# Patient Record
Sex: MALE | Race: WHITE | Employment: OTHER | URBAN - METROPOLITAN AREA
[De-identification: names, ages, dates, MRNs, and addresses within clinical notes are randomized per-mention and may not be internally consistent; named-entity substitution may affect disease eponyms.]

---

## 2022-11-08 RX ORDER — ATORVASTATIN CALCIUM 20 MG/1
TABLET, FILM COATED ORAL
Qty: 90 TABLET | Refills: 1 | Status: SHIPPED | OUTPATIENT
Start: 2022-11-08

## 2022-11-19 RX ORDER — VALSARTAN 160 MG/1
TABLET ORAL
Qty: 90 TABLET | Refills: 1 | Status: SHIPPED | OUTPATIENT
Start: 2022-11-19

## 2023-04-10 ENCOUNTER — OFFICE VISIT (OUTPATIENT)
Dept: INTERNAL MEDICINE CLINIC | Age: 58
End: 2023-04-10
Payer: COMMERCIAL

## 2023-04-10 VITALS
DIASTOLIC BLOOD PRESSURE: 100 MMHG | SYSTOLIC BLOOD PRESSURE: 180 MMHG | BODY MASS INDEX: 35.25 KG/M2 | OXYGEN SATURATION: 97 % | HEART RATE: 108 BPM | TEMPERATURE: 98.2 F | WEIGHT: 238 LBS | RESPIRATION RATE: 16 BRPM | HEIGHT: 69 IN

## 2023-04-10 DIAGNOSIS — U07.1 COVID-19: Primary | ICD-10-CM

## 2023-04-10 DIAGNOSIS — R00.0 TACHYCARDIA: ICD-10-CM

## 2023-04-10 DIAGNOSIS — E78.00 HYPERCHOLESTEROLEMIA: ICD-10-CM

## 2023-04-10 DIAGNOSIS — J01.00 ACUTE NON-RECURRENT MAXILLARY SINUSITIS: ICD-10-CM

## 2023-04-10 DIAGNOSIS — I10 ESSENTIAL HYPERTENSION: ICD-10-CM

## 2023-04-10 DIAGNOSIS — I77.810 AORTIC ROOT DILATATION (HCC): ICD-10-CM

## 2023-04-10 PROCEDURE — 99214 OFFICE O/P EST MOD 30 MIN: CPT | Performed by: INTERNAL MEDICINE

## 2023-04-10 PROCEDURE — 93000 ELECTROCARDIOGRAM COMPLETE: CPT | Performed by: INTERNAL MEDICINE

## 2023-04-10 PROCEDURE — 3074F SYST BP LT 130 MM HG: CPT | Performed by: INTERNAL MEDICINE

## 2023-04-10 PROCEDURE — 3078F DIAST BP <80 MM HG: CPT | Performed by: INTERNAL MEDICINE

## 2023-04-10 RX ORDER — METOPROLOL SUCCINATE 50 MG/1
50 TABLET, EXTENDED RELEASE ORAL DAILY
Qty: 90 TABLET | Refills: 1 | Status: SHIPPED | OUTPATIENT
Start: 2023-04-10

## 2023-04-10 RX ORDER — ATORVASTATIN CALCIUM 20 MG/1
20 TABLET, FILM COATED ORAL DAILY
Qty: 90 TABLET | Refills: 1 | Status: SHIPPED | OUTPATIENT
Start: 2023-04-10

## 2023-04-10 RX ORDER — DOXYCYCLINE 100 MG/1
100 CAPSULE ORAL 2 TIMES DAILY
Qty: 20 CAPSULE | Refills: 0 | Status: SHIPPED | OUTPATIENT
Start: 2023-04-10

## 2023-04-10 RX ORDER — VALSARTAN 160 MG/1
160 TABLET ORAL DAILY
Qty: 90 TABLET | Refills: 1 | Status: SHIPPED | OUTPATIENT
Start: 2023-04-10

## 2023-04-11 LAB — ELECTROCARDIOGRAM,EKG: ABNORMAL

## 2023-04-17 LAB
CREATININE, EXTERNAL: 1.03
LDL-C, EXTERNAL: 60
TOTAL CHOLESTEROL, NCHOLT: 114

## 2023-04-18 LAB
ALBUMIN SERPL-MCNC: 4.3 G/DL (ref 3.8–4.9)
ALBUMIN/GLOB SERPL: 1.5 {RATIO} (ref 1.2–2.2)
ALP SERPL-CCNC: 83 IU/L (ref 44–121)
ALT SERPL-CCNC: 38 IU/L (ref 0–44)
APPEARANCE UR: CLEAR
AST SERPL-CCNC: 17 IU/L (ref 0–40)
BACTERIA #/AREA URNS HPF: NORMAL /[HPF]
BASOPHILS # BLD AUTO: 0.1 X10E3/UL (ref 0–0.2)
BASOPHILS NFR BLD AUTO: 1 %
BILIRUB SERPL-MCNC: 0.7 MG/DL (ref 0–1.2)
BILIRUB UR QL STRIP: NEGATIVE
BUN SERPL-MCNC: 12 MG/DL (ref 6–24)
BUN/CREAT SERPL: 12 (ref 9–20)
CALCIUM SERPL-MCNC: 8.9 MG/DL (ref 8.7–10.2)
CASTS URNS QL MICRO: NORMAL /LPF
CHLORIDE SERPL-SCNC: 106 MMOL/L (ref 96–106)
CHOLEST SERPL-MCNC: 114 MG/DL (ref 100–199)
CO2 SERPL-SCNC: 26 MMOL/L (ref 20–29)
COLOR UR: YELLOW
CREAT SERPL-MCNC: 1.03 MG/DL (ref 0.76–1.27)
EGFRCR SERPLBLD CKD-EPI 2021: 86 ML/MIN/1.73
EOSINOPHIL # BLD AUTO: 0.2 X10E3/UL (ref 0–0.4)
EOSINOPHIL NFR BLD AUTO: 2 %
EPI CELLS #/AREA URNS HPF: NORMAL /HPF (ref 0–10)
ERYTHROCYTE [DISTWIDTH] IN BLOOD BY AUTOMATED COUNT: 13.1 % (ref 11.6–15.4)
GLOBULIN SER CALC-MCNC: 2.9 G/DL (ref 1.5–4.5)
GLUCOSE SERPL-MCNC: 91 MG/DL (ref 70–99)
GLUCOSE UR QL STRIP: NEGATIVE
HCT VFR BLD AUTO: 44.8 % (ref 37.5–51)
HDLC SERPL-MCNC: 35 MG/DL
HGB BLD-MCNC: 15.3 G/DL (ref 13–17.7)
HGB UR QL STRIP: NEGATIVE
IMM GRANULOCYTES # BLD AUTO: 0 X10E3/UL (ref 0–0.1)
IMM GRANULOCYTES NFR BLD AUTO: 0 %
KETONES UR QL STRIP: NEGATIVE
LDLC SERPL CALC-MCNC: 60 MG/DL (ref 0–99)
LEUKOCYTE ESTERASE UR QL STRIP: NEGATIVE
LYMPHOCYTES # BLD AUTO: 1.8 X10E3/UL (ref 0.7–3.1)
LYMPHOCYTES NFR BLD AUTO: 22 %
MAGNESIUM SERPL-MCNC: 2.1 MG/DL (ref 1.6–2.3)
MCH RBC QN AUTO: 29.9 PG (ref 26.6–33)
MCHC RBC AUTO-ENTMCNC: 34.2 G/DL (ref 31.5–35.7)
MCV RBC AUTO: 88 FL (ref 79–97)
MICRO URNS: NORMAL
MICRO URNS: NORMAL
MONOCYTES # BLD AUTO: 0.7 X10E3/UL (ref 0.1–0.9)
MONOCYTES NFR BLD AUTO: 8 %
NEUTROPHILS # BLD AUTO: 5.7 X10E3/UL (ref 1.4–7)
NEUTROPHILS NFR BLD AUTO: 67 %
NITRITE UR QL STRIP: NEGATIVE
PH UR STRIP: 5.5 [PH] (ref 5–7.5)
PLATELET # BLD AUTO: 241 X10E3/UL (ref 150–450)
POTASSIUM SERPL-SCNC: 3.7 MMOL/L (ref 3.5–5.2)
PROT SERPL-MCNC: 7.2 G/DL (ref 6–8.5)
PROT UR QL STRIP: NEGATIVE
RBC # BLD AUTO: 5.12 X10E6/UL (ref 4.14–5.8)
RBC #/AREA URNS HPF: NORMAL /HPF (ref 0–2)
SODIUM SERPL-SCNC: 146 MMOL/L (ref 134–144)
SP GR UR STRIP: 1.02 (ref 1–1.03)
TRIGL SERPL-MCNC: 97 MG/DL (ref 0–149)
TSH SERPL DL<=0.005 MIU/L-ACNC: 1.47 UIU/ML (ref 0.45–4.5)
URINALYSIS REFLEX, 377202: NORMAL
UROBILINOGEN UR STRIP-MCNC: 0.2 MG/DL (ref 0.2–1)
VLDLC SERPL CALC-MCNC: 19 MG/DL (ref 5–40)
WBC # BLD AUTO: 8.4 X10E3/UL (ref 3.4–10.8)
WBC #/AREA URNS HPF: NORMAL /HPF (ref 0–5)

## 2023-04-24 ENCOUNTER — OFFICE VISIT (OUTPATIENT)
Dept: INTERNAL MEDICINE CLINIC | Age: 58
End: 2023-04-24
Payer: COMMERCIAL

## 2023-04-24 VITALS
HEIGHT: 71 IN | WEIGHT: 239.13 LBS | DIASTOLIC BLOOD PRESSURE: 92 MMHG | SYSTOLIC BLOOD PRESSURE: 150 MMHG | RESPIRATION RATE: 16 BRPM | BODY MASS INDEX: 33.48 KG/M2 | HEART RATE: 88 BPM | TEMPERATURE: 98 F | OXYGEN SATURATION: 98 %

## 2023-04-24 DIAGNOSIS — E78.6 LOW HDL (UNDER 40): ICD-10-CM

## 2023-04-24 DIAGNOSIS — Z12.11 SCREENING FOR COLORECTAL CANCER: ICD-10-CM

## 2023-04-24 DIAGNOSIS — Z12.12 SCREENING FOR COLORECTAL CANCER: ICD-10-CM

## 2023-04-24 DIAGNOSIS — R73.09 ABNORMAL BLOOD SUGAR: ICD-10-CM

## 2023-04-24 DIAGNOSIS — I77.810 AORTIC ROOT DILATATION (HCC): ICD-10-CM

## 2023-04-24 DIAGNOSIS — U09.9 POST-COVID CHRONIC COUGH: Primary | ICD-10-CM

## 2023-04-24 DIAGNOSIS — R05.3 POST-COVID CHRONIC COUGH: Primary | ICD-10-CM

## 2023-04-24 DIAGNOSIS — Z11.59 NEED FOR HEPATITIS C SCREENING TEST: ICD-10-CM

## 2023-04-24 DIAGNOSIS — G47.33 OSA (OBSTRUCTIVE SLEEP APNEA): ICD-10-CM

## 2023-04-24 DIAGNOSIS — I10 ESSENTIAL HYPERTENSION: ICD-10-CM

## 2023-04-24 DIAGNOSIS — R00.2 INTERMITTENT PALPITATIONS: ICD-10-CM

## 2023-04-24 PROCEDURE — 99214 OFFICE O/P EST MOD 30 MIN: CPT | Performed by: INTERNAL MEDICINE

## 2023-04-24 PROCEDURE — 3077F SYST BP >= 140 MM HG: CPT | Performed by: INTERNAL MEDICINE

## 2023-04-24 PROCEDURE — 3080F DIAST BP >= 90 MM HG: CPT | Performed by: INTERNAL MEDICINE

## 2023-04-24 RX ORDER — ZOSTER VACCINE RECOMBINANT, ADJUVANTED 50 MCG/0.5
0.5 KIT INTRAMUSCULAR ONCE
Qty: 1 EACH | Refills: 1 | Status: SHIPPED | OUTPATIENT
Start: 2023-04-24 | End: 2023-04-24

## 2023-04-24 RX ORDER — HYDROCODONE POLISTIREX AND CHLORPHENIRAMINE POLISTIREX 10; 8 MG/5ML; MG/5ML
5 SUSPENSION, EXTENDED RELEASE ORAL
Qty: 120 ML | Refills: 0 | Status: SHIPPED | OUTPATIENT
Start: 2023-04-24 | End: 2023-05-01

## 2023-04-24 NOTE — PROGRESS NOTES
[address] [facility]  [address]  [place], [address] [facility]  Phone: [phone]      Susan Mark (: 1965) is a 56 y.o. male, established patient, here for evaluation of the following chief complaint(s):  Follow Up Chronic Condition and Abnormal Lab Results         SUBJECTIVE/OBJECTIVE:  HPI:  Susan Mark is being seen today for follow up. He had labs drawn on 2023. He checks his BP occasionally and denies any chest pain, SOB, or headaches. He has finished his antibiotic and states he still has a dry cough. He does not need any refills. Patient states he took Toprol only 1 day. Felt like his heart rate was going up. Patient states he has lost 10 pounds since last visit. Cough is mainly in the morning. Patient is trying to get his CPAP machine adjusted. Patient had blood work done on 2023 CBC within normal limit. Sodium 146. Potassium 3.7. Blood sugar 91. LFTs within normal limit. Urine analysis negative. Total cholesterol 114. HDL 35. LDL 60. TSH 1.47. Magnesium 2.1. Prior to Admission medications    Medication Sig Start Date End Date Taking? Authorizing Provider   HYDROcodone-chlorpheniramine (TUSSIONEX) 10-8 mg/5 mL suspension Take 5 mL by mouth every twelve (12) hours as needed for Cough for up to 7 days. Max Daily Amount: 10 mL. 23 Yes Vitaly Simpson MD   diphth,pertus,acell,,tetanus (BOOSTRIX TDAP) 2.5-8-5 Lf-mcg-Lf/0.5mL susp suspension 0.5 mL by IntraMUSCular route once for 1 dose. Indications: vaccination to prevent diphtheria, pertussis and tetanus 23 Yes Vitaly Simpson MD   varicella-zoster recombinant, PF, (Shingrix, PF,) 50 mcg/0.5 mL susr injection 0.5 mL by IntraMUSCular route once for 1 dose.  23 Yes Vitaly Simpson MD   hydrocortisone (HYTONE) 2.5 % topical cream APPLY 1 GRAM TO AFFECTED AREA ON FACE TWICE DAILY FOR 2 WEEKS AS DIRECTED THEN TWICE DAILY SATURDAY-SUN 1/21/23  Yes Provider, Historical   atorvastatin (LIPITOR) 20 mg tablet Take 1 Tablet by mouth daily. 4/10/23  Yes John Becerra MD   valsartan (DIOVAN) 160 mg tablet Take 1 Tablet by mouth daily. 4/10/23  Yes John Becerra MD   metoprolol succinate (TOPROL-XL) 50 mg XL tablet Take 1 Tablet by mouth daily. 4/10/23  Yes John Becerra MD   doxycycline (MONODOX) 100 mg capsule Take 1 Capsule by mouth two (2) times a day. Patient not taking: Reported on 4/24/2023 4/10/23   John Becerra MD        Allergies   Allergen Reactions    Pollen Extracts Itching        Past Medical History:   Diagnosis Date    Benign essential hypertension     H/O seasonal allergies     Hypercholesterolemia     Prediabetes     Sleep apnea in adult         Family History   Problem Relation Age of Onset    Hypertension Mother     Cancer Father     Heart Attack Brother     Parkinson's Disease Maternal Grandmother         History reviewed. No pertinent surgical history. Review of Systems   Constitutional:  Negative for chills and fever. HENT:  Negative for congestion, ear pain, nosebleeds, sinus pain, sore throat and tinnitus. Eyes:  Negative for redness. Respiratory:  Positive for cough. Negative for shortness of breath. Cardiovascular:  Negative for chest pain and palpitations. Gastrointestinal:  Negative for abdominal pain, diarrhea, nausea and vomiting. Endocrine: Negative for cold intolerance and polyuria. Genitourinary:  Negative for dysuria and hematuria. Musculoskeletal:  Negative for back pain and neck pain. Skin:  Negative for rash. Neurological:  Negative for dizziness and headaches. Psychiatric/Behavioral: Negative.        BP (!) 150/92 (BP 1 Location: Left upper arm, BP Patient Position: Sitting, BP Cuff Size: Adult)   Pulse 88   Temp 98 °F (36.7 °C) (Temporal)   Resp 16   Ht 5' 11\" (1.803 m)   Wt 239 lb 2 oz (108.5 kg)   SpO2 98%   BMI 33.35 kg/m² Physical Exam  Gen:  Not  in no acute distress. Well built and nourished. HEENT:  [place] conjunctivae, SANDEE, EOMI, hearing intact . Mouth: Moist mucous membranes. No TPC +ve geographic tongue  Neck:  Supple, without masses, thyroid not enlarged  Resp:  No accessory muscle use, clear breath sounds without wheezes rales or rhonchi  Card:  No murmurs, normal S1, S2 without thrills, bruits or peripheral edema  Abd:  Soft, non-tender, non-distended, normoactive bowel sounds are present, no palpable organomegaly and no detectable hernias  Lymph:  No cervical or inguinal adenopathy  Musc:  No cyanosis or clubbing. Gait: Normal  Skin:  No rashes or ulcers, skin turgor is good  Neuro: Alert and oriented x 3. Cranial nerves are grossly intact, no focal motor weakness, follows commands appropriately  Psych:  Good insight, mood normal.   ASSESSMENT/PLAN:  Below is the assessment and plan developed based on review of pertinent history, physical exam, labs, studies, and medications. 1. Post-COVID chronic cough  Comments: We will place him on Tussionex. Patient reassured. Most likely secondary to post-COVID. No signs of infection. Orders:  -     HYDROcodone-chlorpheniramine (TUSSIONEX) 10-8 mg/5 mL suspension; Take 5 mL by mouth every twelve (12) hours as needed for Cough for up to 7 days. Max Daily Amount: 10 mL., Normal, Disp-120 mL, R-0  2. Intermittent palpitations  Comments: To restart Toprol. K 3.7 Advised high potassium diet  Orders:  -     REFERRAL TO CARDIOLOGY  3. Low HDL (under 40)  Comments:  Continue exercise  Orders:  -     LIPID PANEL; Future  -     METABOLIC PANEL, COMPREHENSIVE; Future  4. Screening for colorectal cancer  Comments:  Advised colonoscopy  Orders:  -     REFERRAL TO GASTROENTEROLOGY  5. Essential hypertension  Comments:  BP still elevated. Advised to restart Toprol-XL and do not skip medications  6. Aortic root dilatation [facility])  Comments:  Cardiology to see.   Need to have repeat 2D echo.  Orders:  -     REFERRAL TO CARDIOLOGY  7. Need for hepatitis C screening test  -     HEPATITIS C AB; Future  8. Abnormal blood sugar  Comments:  Advised low-carb diet. Orders:  -     HEMOGLOBIN A1C WITH EAG; Future  9. MIRACLE (obstructive sleep apnea)  Comments:  Keep up appointment with sleep  clinic      Return in about 4 months (around 8/24/2023) for follow up. Patient Instructions   To restart Toprol. Health Maintenance Due   Topic Date Due    Hepatitis C Screening  Never done    Depression Screen  Never done    DTaP/Tdap/Td series (1 - Tdap) Never done    Colorectal Cancer Screening Combo  Never done    Shingles Vaccine (1 of 2) Never done        Aspects of this note may have been generated using voice recognition software. Despite editing, there may be unrecognized errors. An electronic signature was used to authenticate this note.   -- Jose Lopez MD

## 2023-04-24 NOTE — PROGRESS NOTES
1. \"Have you been to the ER, urgent care clinic since your last visit? Hospitalized since your last visit? \" No    2. \"Have you seen or consulted any other health care providers outside of the [address] [place] [address] since your last visit? \" No     3. For patients aged 39-70: Has the patient had a colonoscopy / FIT/ Cologuard? No      If the patient is female:    4. For patients aged 41-77: Has the patient had a mammogram within the past 2 years? NA - based on age or sex      11. For patients aged 21-65: Has the patient had a pap smear?  NA - based on age or sex

## 2023-05-08 DIAGNOSIS — Z11.59 NEED FOR HEPATITIS C SCREENING TEST: Primary | ICD-10-CM

## 2023-05-08 DIAGNOSIS — E78.6 LOW HDL (UNDER 40): Primary | ICD-10-CM

## 2023-05-17 RX ORDER — ATORVASTATIN CALCIUM 20 MG/1
TABLET, FILM COATED ORAL
Qty: 90 TABLET | Refills: 1 | Status: SHIPPED | OUTPATIENT
Start: 2023-05-17

## 2023-08-24 DIAGNOSIS — Z11.59 NEED FOR HEPATITIS C SCREENING TEST: ICD-10-CM

## 2023-08-24 DIAGNOSIS — E78.6 LOW HDL (UNDER 40): ICD-10-CM

## 2023-09-13 ENCOUNTER — TELEPHONE (OUTPATIENT)
Facility: CLINIC | Age: 58
End: 2023-09-13

## 2023-09-13 NOTE — TELEPHONE ENCOUNTER
----- Message from Arjun Greene sent at 9/13/2023 10:26 AM EDT -----  Subject: Appointment Request    Reason for Call: Established Patient Appointment needed: Routine Existing   Condition Follow Up    QUESTIONS    Reason for appointment request? Available appointments did not meet   patient need     Additional Information for Provider? pts wife Pily Silva would like to schedule   the pt a follow up appt for his blood pressure.  ---------------------------------------------------------------------------  --------------  [facility]  [phone]; OK to leave message on voicemail  ---------------------------------------------------------------------------  --------------  SCRIPT ANSWERS

## 2023-12-18 DIAGNOSIS — I10 ESSENTIAL HYPERTENSION, BENIGN: ICD-10-CM

## 2023-12-18 DIAGNOSIS — E78.00 PURE HYPERCHOLESTEROLEMIA, UNSPECIFIED: ICD-10-CM

## 2023-12-18 DIAGNOSIS — R73.09 OTHER ABNORMAL GLUCOSE: ICD-10-CM

## 2023-12-18 DIAGNOSIS — Z12.5 SCREENING FOR PROSTATE CANCER: ICD-10-CM

## 2024-01-18 RX ORDER — VALSARTAN 320 MG/1
320 TABLET ORAL DAILY
Qty: 90 TABLET | Refills: 0 | Status: SHIPPED | OUTPATIENT
Start: 2024-01-18 | End: 2024-04-17

## 2024-02-22 LAB
ALBUMIN SERPL-MCNC: 4.4 G/DL (ref 3.8–4.9)
ALBUMIN/GLOB SERPL: 1.7 {RATIO} (ref 1.2–2.2)
ALP SERPL-CCNC: 78 IU/L (ref 44–121)
ALT SERPL-CCNC: 34 IU/L (ref 0–44)
AST SERPL-CCNC: 23 IU/L (ref 0–40)
BILIRUB SERPL-MCNC: 0.8 MG/DL (ref 0–1.2)
BUN SERPL-MCNC: 13 MG/DL (ref 6–24)
BUN/CREAT SERPL: 13 (ref 9–20)
CALCIUM SERPL-MCNC: 9.1 MG/DL (ref 8.7–10.2)
CHLORIDE SERPL-SCNC: 103 MMOL/L (ref 96–106)
CHOLEST SERPL-MCNC: 128 MG/DL (ref 100–199)
CO2 SERPL-SCNC: 24 MMOL/L (ref 20–29)
CREAT SERPL-MCNC: 1 MG/DL (ref 0.76–1.27)
EGFRCR SERPLBLD CKD-EPI 2021: 88 ML/MIN/1.73
GLOBULIN SER CALC-MCNC: 2.6 G/DL (ref 1.5–4.5)
GLUCOSE SERPL-MCNC: 101 MG/DL (ref 70–99)
HBA1C MFR BLD: 6.2 % (ref 4.8–5.6)
HDLC SERPL-MCNC: 29 MG/DL
LDLC SERPL CALC-MCNC: 67 MG/DL (ref 0–99)
POTASSIUM SERPL-SCNC: 4.4 MMOL/L (ref 3.5–5.2)
PROT SERPL-MCNC: 7 G/DL (ref 6–8.5)
PSA SERPL-MCNC: 3.1 NG/ML (ref 0–4)
SODIUM SERPL-SCNC: 144 MMOL/L (ref 134–144)
TRIGL SERPL-MCNC: 190 MG/DL (ref 0–149)
TSH SERPL DL<=0.005 MIU/L-ACNC: 1.49 UIU/ML (ref 0.45–4.5)
VLDLC SERPL CALC-MCNC: 32 MG/DL (ref 5–40)

## 2024-06-26 ENCOUNTER — TELEPHONE (OUTPATIENT)
Facility: CLINIC | Age: 59
End: 2024-06-26

## 2024-06-26 DIAGNOSIS — R10.9 ABDOMINAL DISCOMFORT: ICD-10-CM

## 2024-06-26 DIAGNOSIS — R10.9 ABDOMINAL DISCOMFORT: Primary | ICD-10-CM

## 2024-06-26 RX ORDER — FAMOTIDINE 20 MG/1
20 TABLET, FILM COATED ORAL 2 TIMES DAILY
Qty: 60 TABLET | Refills: 5 | Status: SHIPPED | OUTPATIENT
Start: 2024-06-26

## 2024-06-26 RX ORDER — FAMOTIDINE 20 MG/1
20 TABLET, FILM COATED ORAL 2 TIMES DAILY
Qty: 60 TABLET | Refills: 5 | Status: SHIPPED | OUTPATIENT
Start: 2024-06-26 | End: 2024-06-26 | Stop reason: SDUPTHER

## 2024-06-26 NOTE — TELEPHONE ENCOUNTER
Patient came into the office stating he wanted to see if he could get something sent over for his stomach he is having gas pains and cramping he has and appointment on July 1 Ruby Lubin and if he could get something sent over could we have it sent to the [facility].

## 2024-06-27 DIAGNOSIS — E78.00 PURE HYPERCHOLESTEROLEMIA, UNSPECIFIED: ICD-10-CM

## 2024-06-27 RX ORDER — ATORVASTATIN CALCIUM 20 MG/1
20 TABLET, FILM COATED ORAL DAILY
Qty: 90 TABLET | Refills: 1 | Status: SHIPPED | OUTPATIENT
Start: 2024-06-27

## 2024-07-01 ENCOUNTER — OFFICE VISIT (OUTPATIENT)
Facility: CLINIC | Age: 59
End: 2024-07-01
Payer: COMMERCIAL

## 2024-07-01 VITALS
SYSTOLIC BLOOD PRESSURE: 135 MMHG | BODY MASS INDEX: 31.22 KG/M2 | HEIGHT: 71 IN | DIASTOLIC BLOOD PRESSURE: 86 MMHG | WEIGHT: 223 LBS | RESPIRATION RATE: 15 BRPM | TEMPERATURE: 98 F | OXYGEN SATURATION: 98 % | HEART RATE: 98 BPM

## 2024-07-01 DIAGNOSIS — R59.0 ENLARGED LYMPH NODE IN NECK: ICD-10-CM

## 2024-07-01 DIAGNOSIS — R14.3 FLATULENCE: ICD-10-CM

## 2024-07-01 DIAGNOSIS — R73.03 PRE-DIABETES: ICD-10-CM

## 2024-07-01 DIAGNOSIS — K52.9 CHRONIC DIARRHEA: ICD-10-CM
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
## 2024-07-01 DIAGNOSIS — I10 BENIGN ESSENTIAL HYPERTENSION: Primary | ICD-10-CM

## 2024-07-01 DIAGNOSIS — E78.00 HYPERCHOLESTEROLEMIA: ICD-10-CM

## 2024-07-01 DIAGNOSIS — I77.810 THORACIC AORTIC ECTASIA (HCC): ICD-10-CM

## 2024-07-01 DIAGNOSIS — I10 BENIGN ESSENTIAL HYPERTENSION: ICD-10-CM

## 2024-07-01 DIAGNOSIS — R00.0 TACHYCARDIA: ICD-10-CM

## 2024-07-01 PROCEDURE — 99214 OFFICE O/P EST MOD 30 MIN: CPT | Performed by: INTERNAL MEDICINE

## 2024-07-01 PROCEDURE — 3079F DIAST BP 80-89 MM HG: CPT | Performed by: INTERNAL MEDICINE

## 2024-07-01 PROCEDURE — 3075F SYST BP GE 130 - 139MM HG: CPT | Performed by: INTERNAL MEDICINE

## 2024-07-01 RX ORDER — ASPIRIN 81 MG/1
81 TABLET ORAL DAILY
COMMUNITY

## 2024-07-01 RX ORDER — KETOCONAZOLE 20 MG/ML
SHAMPOO TOPICAL
COMMUNITY
Start: 2024-05-28

## 2024-07-01 ASSESSMENT — ENCOUNTER SYMPTOMS
SHORTNESS OF BREATH: 0
COUGH: 0
DIARRHEA: 1
VOMITING: 0
NAUSEA: 0
ABDOMINAL PAIN: 0

## 2024-07-01 ASSESSMENT — PATIENT HEALTH QUESTIONNAIRE - PHQ9
1. LITTLE INTEREST OR PLEASURE IN DOING THINGS: NOT AT ALL
SUM OF ALL RESPONSES TO PHQ QUESTIONS 1-9: 0
2. FEELING DOWN, DEPRESSED OR HOPELESS: NOT AT ALL
SUM OF ALL RESPONSES TO PHQ QUESTIONS 1-9: 0
SUM OF ALL RESPONSES TO PHQ9 QUESTIONS 1 & 2: 0

## 2024-07-01 NOTE — ASSESSMENT & PLAN NOTE
Offered doxycycline.  Patient wants to wait for Lyme titers.  Advised to apply DEET whenever he goes outside

## 2024-07-01 NOTE — PROGRESS NOTES
\"Have you been to the ER, urgent care clinic since your last visit?  Hospitalized since your last visit?\"    NO    “Have you seen or consulted any other health care providers outside of [facility] since your last visit?”    NO        “Have you had a colorectal cancer screening such as a colonoscopy/FIT/Cologuard?    NO Patient has one scheduled in August    No colonoscopy on file  No cologuard on file  No FIT/FOBT on file   No flexible sigmoidoscopy on file         Click Here for Release of Records Request

## 2024-07-01 NOTE — ASSESSMENT & PLAN NOTE
Etiology unclear.  Will check alpha gal antibody.  Will also check for celiac disease panel.  Patient has appointment coming up with gastroenterologist in August

## 2024-07-01 NOTE — PROGRESS NOTES
[facility]  [address], [place] [address]  Phone: [phone]      Casey Elias (: 1965) is a 58 y.o. male, established patient, here for evaluation of the following chief complaint(s):  Follow-up Chronic Condition, Gas, and Discuss tick bites         SUBJECTIVE/OBJECTIVE:  History of Present Illness  This is a 58-year-old male with past medical history of hypertension, hypercholesterolemia, abnormal sugar, here for 6-month follow-up.    Patient has been experiencing flatulence for the past 5 weeks, accompanied by diarrhea approximately once every 2 weeks. These symptoms tend to recur when he discontinues his heartburn medication. A colonoscopy is scheduled for 2024, however, he was unable to secure an earlier appointment due to their full schedule. He has recently transitioned to a super diet, which he believes may have contributed to his flatulence. He has also increased his consumption of new sugar drinks over the past 3 months, which he believes may be contributing to his symptoms. Approximately 1.5 months ago, he initiated a diet that included increased protein intake, steak, and eliminated sugar from his diet. He transitioned to sugar-free sugar substitutes for approximately 3 weeks. He traveled to [place] for a week and consumed fast food, which he believes may have contributed to his diarrhea. His diarrhea is triggered by eggs and meat, and he has abstained from fast food for the past 2 months. He found some relief from his symptoms with the use of Gas-X, but not his flatulence. He took 2 Gas-X pills at 3:00 PM yesterday, which provided some relief from his gas. Today, he reports minimal gas and no cramping. His diet is limited due to fear of gastrointestinal upset.    Patient expresses a desire to be tested for alpha-gal due to a recent tick bite and because of GI symptoms.    Patient's heart rate remains elevated at 112. He is not

## 2024-07-03 ENCOUNTER — TELEPHONE (OUTPATIENT)
Facility: CLINIC | Age: 59
End: 2024-07-03

## 2024-07-03 LAB
ALBUMIN SERPL-MCNC: 4.8 G/DL (ref 3.8–4.9)
ALP SERPL-CCNC: 86 IU/L (ref 44–121)
ALT SERPL-CCNC: 22 IU/L (ref 0–44)
AST SERPL-CCNC: 19 IU/L (ref 0–40)
B BURGDOR IGG+IGM SER QL IA: NEGATIVE
BASOPHILS # BLD AUTO: 0.1 X10E3/UL (ref 0–0.2)
BASOPHILS NFR BLD AUTO: 2 %
BILIRUB SERPL-MCNC: 1.2 MG/DL (ref 0–1.2)
BUN SERPL-MCNC: 13 MG/DL (ref 6–24)
BUN/CREAT SERPL: 13 (ref 9–20)
CALCIUM SERPL-MCNC: 9.4 MG/DL (ref 8.7–10.2)
CHLORIDE SERPL-SCNC: 103 MMOL/L (ref 96–106)
CHOLEST SERPL-MCNC: 118 MG/DL (ref 100–199)
CO2 SERPL-SCNC: 24 MMOL/L (ref 20–29)
CREAT SERPL-MCNC: 1.02 MG/DL (ref 0.76–1.27)
EGFRCR SERPLBLD CKD-EPI 2021: 86 ML/MIN/1.73
EOSINOPHIL # BLD AUTO: 0.1 X10E3/UL (ref 0–0.4)
EOSINOPHIL NFR BLD AUTO: 1 %
ERYTHROCYTE [DISTWIDTH] IN BLOOD BY AUTOMATED COUNT: 12.9 % (ref 11.6–15.4)
GLOBULIN SER CALC-MCNC: 2.7 G/DL (ref 1.5–4.5)
GLUCOSE SERPL-MCNC: 108 MG/DL (ref 70–99)
HBA1C MFR BLD: 6.3 % (ref 4.8–5.6)
HCT VFR BLD AUTO: 47.3 % (ref 37.5–51)
HDLC SERPL-MCNC: 32 MG/DL
HGB BLD-MCNC: 15.7 G/DL (ref 13–17.7)
IMM GRANULOCYTES # BLD AUTO: 0 X10E3/UL (ref 0–0.1)
IMM GRANULOCYTES NFR BLD AUTO: 0 %
LDLC SERPL CALC-MCNC: 67 MG/DL (ref 0–99)
LYMPHOCYTES # BLD AUTO: 1.3 X10E3/UL (ref 0.7–3.1)
LYMPHOCYTES NFR BLD AUTO: 28 %
MCH RBC QN AUTO: 29.8 PG (ref 26.6–33)
MCHC RBC AUTO-ENTMCNC: 33.2 G/DL (ref 31.5–35.7)
MCV RBC AUTO: 90 FL (ref 79–97)
MONOCYTES # BLD AUTO: 0.4 X10E3/UL (ref 0.1–0.9)
MONOCYTES NFR BLD AUTO: 8 %
NEUTROPHILS # BLD AUTO: 2.8 X10E3/UL (ref 1.4–7)
NEUTROPHILS NFR BLD AUTO: 61 %
PLATELET # BLD AUTO: 192 X10E3/UL (ref 150–450)
POTASSIUM SERPL-SCNC: 4.2 MMOL/L (ref 3.5–5.2)
PROT SERPL-MCNC: 7.5 G/DL (ref 6–8.5)
RBC # BLD AUTO: 5.26 X10E6/UL (ref 4.14–5.8)
SODIUM SERPL-SCNC: 142 MMOL/L (ref 134–144)
T3 SERPL-MCNC: 136 NG/DL (ref 71–180)
T4 SERPL-MCNC: 8.9 UG/DL (ref 4.5–12)
TRIGL SERPL-MCNC: 100 MG/DL (ref 0–149)
TSH SERPL DL<=0.005 MIU/L-ACNC: 1.31 UIU/ML (ref 0.45–4.5)
VLDLC SERPL CALC-MCNC: 19 MG/DL (ref 5–40)
WBC # BLD AUTO: 4.6 X10E3/UL (ref 3.4–10.8)

## 2024-07-03 NOTE — TELEPHONE ENCOUNTER
Patient called in this afternoon stating he was just calling to see if his lab results had came back or not and asked if  could call and let him know the results if they came back Ruby Lubin

## 2024-07-04 LAB
ENDOMYSIUM IGA SER QL: NEGATIVE
H PYLORI AG STL QL IA: NEGATIVE
IGA SERPL-MCNC: 327 MG/DL (ref 90–386)
TTG IGA SER-ACNC: 3 U/ML (ref 0–3)

## 2024-07-05 LAB
ALPHA-GAL IGE QN: 5.55 KU/L
BEEF IGE QN: 2.15 KU/L
IGE SERPL-ACNC: 51 IU/ML (ref 6–495)
LAMB IGE QN: 1.55 KU/L
Lab: ABNORMAL
PORK IGE QN: 1.32 KU/L

## 2024-07-08 ENCOUNTER — TELEPHONE (OUTPATIENT)
Facility: CLINIC | Age: 59
End: 2024-07-08

## 2024-07-08 DIAGNOSIS — Z91.018 ALLERGY TO ALPHA-GAL: Primary | ICD-10-CM

## 2024-07-08 NOTE — TELEPHONE ENCOUNTER
Pt is asking about referral to allergist or immunologist for immunotherapy to alpha gal. KASIE LANDAVERDE MA

## 2024-07-19 ENCOUNTER — TELEMEDICINE (OUTPATIENT)
Facility: CLINIC | Age: 59
End: 2024-07-19
Payer: COMMERCIAL

## 2024-07-19 DIAGNOSIS — R41.3 MEMORY CHANGES: ICD-10-CM

## 2024-07-19 DIAGNOSIS — R53.81 MALAISE AND FATIGUE: ICD-10-CM

## 2024-07-19 DIAGNOSIS — K52.9 CHRONIC DIARRHEA: ICD-10-CM

## 2024-07-19 DIAGNOSIS — R63.4 WEIGHT LOSS, NON-INTENTIONAL: Primary | ICD-10-CM

## 2024-07-19 DIAGNOSIS — R53.83 MALAISE AND FATIGUE: ICD-10-CM
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
## 2024-07-19 DIAGNOSIS — I10 BENIGN ESSENTIAL HYPERTENSION: ICD-10-CM

## 2024-07-19 DIAGNOSIS — R63.4 WEIGHT LOSS, NON-INTENTIONAL: ICD-10-CM

## 2024-07-19 PROCEDURE — 99213 OFFICE O/P EST LOW 20 MIN: CPT | Performed by: INTERNAL MEDICINE

## 2024-07-19 ASSESSMENT — ENCOUNTER SYMPTOMS
NAUSEA: 0
DIARRHEA: 0
ABDOMINAL PAIN: 0
SHORTNESS OF BREATH: 0
COUGH: 0
VOMITING: 0

## 2024-07-19 NOTE — ASSESSMENT & PLAN NOTE
Patient has no unintentional weight loss with the diarrhea.His fever and diarrhea have resolved, however, there is a possibility of diverticulitis. A CT scan of the abdomen and pelvis with and without contrast has been ordered. GI consult on 8/1/2024

## 2024-07-19 NOTE — PROGRESS NOTES
Casey Elias (: 1965) is a 59 y.o. male, patient, here for evaluation of the following chief complaint(s):   Diarrhea (Tried immodium. Worked.  Discuss lab-alpha gal)         SUBJECTIVE/OBJECTIVE:  History of Present Illness  The patient is a 59-year-old male with a past medical history of hypertension, hypercholesterolemia, and recently diagnosed with alpha-gal syndrome. He is seen today for diarrhea and abdominal cramping for the past 3 to 4 weeks.  Patient is seen virtually today since he does not want to come to the office    He tested positive for alpha-gal syndrome. His diarrhea ceased 3 to 4 days ago, but he also experienced a low-grade fever. His weight has increased from 215 pounds to 217.6 pounds since his diarrhea ceased. He has been attempting to schedule an appointment with a gastroenterologist. He has been experiencing intermittent diarrhea since 2024. His weight loss , he and  his wife are worried but he still struggles to adapt to eating turkey and fish. He experienced abdominal pain during his diarrhea episode. He occasionally takes famotidine for reflux.    He reports a lack of interest in activities and a lack of motivation to engage in activities. He is considering checking his testosterone levels.  Patient is also worried about his memory but not that sharp anymore.  Wife wants to check for other tickborne diseases.    He has a follow-up appointment with his cardiologist in 2024. His blood pressure remains high, with a pulse rate of 88.     Lab reviewed showed sodium 142.  Potassium 4.2.  BUN 13.  Creatinine 1.02.  Blood sugar 108.  .  A1c 6.3.  TSH 1.3.  White count 4.6.  Hemoglobin 15.7.  Platelet count 192.  No Known Allergies  Current Outpatient Medications   Medication Sig Dispense Refill    Coenzyme Q10 (CO Q 10 PO) Take by mouth      Multiple Vitamin (MULTI VITAMIN DAILY PO) Take by mouth      ketoconazole (NIZORAL) 2 % shampoo APPLY 1 ML TOPICALLY EVERY DAY       Telemetry Bed?: Yes   Admitting Physician: NU MONTIEL [[id number]]   Transferring Patient to? Only adjust for transfers between [facility] [[id number]]

## 2024-07-19 NOTE — PROGRESS NOTES
Chief Complaint   Patient presents with    Diarrhea     Tried immodium. Worked.  Discuss lab-alpha gal     Blood pressure >153/92. Checked at home for Virtual visit.   Pulse>88

## 2024-07-19 NOTE — ASSESSMENT & PLAN NOTE
Most likely secondary to Chronic diarrhea.  Patient wants to check testosterone level.  Will check it.  Informed patient that since PSA is 3.1 he may not be able to take testosterone supplement if it comes back low

## 2024-07-24 LAB
B BURGDOR IGG+IGM SER QL IA: NEGATIVE
B MICROTI IGG TITR SER: NORMAL {TITER}
B MICROTI IGM TITR SER: NORMAL {TITER}
Lab: NORMAL

## 2024-07-25 LAB
TESTOST FREE SERPL-MCNC: 12.5 PG/ML (ref 7.2–24)
TESTOST SERPL-MCNC: 385 NG/DL (ref 264–916)

## 2024-07-27 LAB — A PHAGOCYTOPH DNA BLD QL NAA+PROBE: NEGATIVE

## 2024-08-01 LAB
A PHAGOCYTOPH IGG TITR SER IF: NEGATIVE {TITER}
A PHAGOCYTOPH IGM TITR SER IF: NEGATIVE {TITER}
COMMENT: NORMAL
COMMENT: NORMAL
E CHAFFEENSIS IGG TITR SER IF: NEGATIVE {TITER}
E CHAFFEENSIS IGM TITR SER IF: NEGATIVE {TITER}
RICK SF IGG TITR SER: NORMAL {TITER}
RICK SF IGM TITR SER: NORMAL {TITER}

## 2024-08-05 ENCOUNTER — TELEPHONE (OUTPATIENT)
Facility: CLINIC | Age: 59
End: 2024-08-05

## 2024-08-05 NOTE — TELEPHONE ENCOUNTER
Patients wife called in stated that her  needs approval on the referral for his CT before he goes Friday Ruby Lubin

## 2024-08-23 ENCOUNTER — TELEPHONE (OUTPATIENT)
Facility: CLINIC | Age: 59
End: 2024-08-23

## 2024-08-26 DIAGNOSIS — I10 ESSENTIAL HYPERTENSION, BENIGN: ICD-10-CM

## 2024-08-26 RX ORDER — VALSARTAN 320 MG/1
320 TABLET ORAL DAILY
Qty: 90 TABLET | Refills: 1 | Status: SHIPPED | OUTPATIENT
Start: 2024-08-26

## 2024-11-06 ENCOUNTER — OFFICE VISIT (OUTPATIENT)
Facility: CLINIC | Age: 59
End: 2024-11-06
Payer: COMMERCIAL

## 2024-11-06 VITALS
OXYGEN SATURATION: 98 % | RESPIRATION RATE: 16 BRPM | WEIGHT: 222 LBS | BODY MASS INDEX: 31.08 KG/M2 | SYSTOLIC BLOOD PRESSURE: 136 MMHG | HEIGHT: 71 IN | TEMPERATURE: 98 F | HEART RATE: 96 BPM | DIASTOLIC BLOOD PRESSURE: 79 MMHG

## 2024-11-06 DIAGNOSIS — E78.00 PURE HYPERCHOLESTEROLEMIA, UNSPECIFIED: ICD-10-CM

## 2024-11-06 DIAGNOSIS — I10 ESSENTIAL HYPERTENSION, BENIGN: ICD-10-CM

## 2024-11-06 DIAGNOSIS — R73.03 PRE-DIABETES: ICD-10-CM

## 2024-11-06 DIAGNOSIS — R00.0 TACHYCARDIA: ICD-10-CM

## 2024-11-06 DIAGNOSIS — Z91.018 ALLERGY TO ALPHA-GAL: ICD-10-CM

## 2024-11-06 DIAGNOSIS — I10 ESSENTIAL HYPERTENSION, BENIGN: Primary | ICD-10-CM

## 2024-11-06 DIAGNOSIS — G47.33 OSA ON CPAP: ICD-10-CM

## 2024-11-06 PROCEDURE — 3078F DIAST BP <80 MM HG: CPT | Performed by: INTERNAL MEDICINE

## 2024-11-06 PROCEDURE — 3075F SYST BP GE 130 - 139MM HG: CPT | Performed by: INTERNAL MEDICINE

## 2024-11-06 PROCEDURE — 99214 OFFICE O/P EST MOD 30 MIN: CPT | Performed by: INTERNAL MEDICINE

## 2024-11-06 RX ORDER — ZOSTER VACCINE RECOMBINANT, ADJUVANTED 50 MCG/0.5
0.5 KIT INTRAMUSCULAR SEE ADMIN INSTRUCTIONS
Qty: 0.5 ML | Refills: 0 | Status: SHIPPED | OUTPATIENT
Start: 2024-11-06 | End: 2025-05-05

## 2024-11-06 RX ORDER — ATORVASTATIN CALCIUM 20 MG/1
20 TABLET, FILM COATED ORAL DAILY
Qty: 90 TABLET | Refills: 1 | Status: SHIPPED | OUTPATIENT
Start: 2024-11-06

## 2024-11-06 RX ORDER — VALSARTAN 320 MG/1
320 TABLET ORAL DAILY
Qty: 90 TABLET | Refills: 1 | Status: SHIPPED | OUTPATIENT
Start: 2024-11-06

## 2024-11-06 ASSESSMENT — ENCOUNTER SYMPTOMS
NAUSEA: 0
COUGH: 0
VOMITING: 0
ABDOMINAL PAIN: 0
DIARRHEA: 0
SHORTNESS OF BREATH: 0

## 2024-11-06 NOTE — PROGRESS NOTES
Chief Complaint   Patient presents with    Hypertension    Follow-up    /79 (Site: Right Upper Arm, Position: Sitting, Cuff Size: Medium Adult)   Pulse 100   Temp 98 °F (36.7 °C) (Oral)   Ht 1.803 m (5' 11\")   Wt 100.7 kg (222 lb)   SpO2 96%   BMI 30.96 kg/m²  1. Have you been to the ER, urgent care clinic since your last visit?  Hospitalized since your last visit?No    2. Have you seen or consulted any other health care providers outside of the [facility] since your last visit?  Include any pap smears or colon screening. No

## 2024-11-06 NOTE — PROGRESS NOTES
[facility]  [address], [place] [address]  Phone: [phone]      Casey Elias (: 1965) is a 59 y.o. male, established patient, here for evaluation of the following chief complaint(s):  Hypertension and Follow-up         SUBJECTIVE/OBJECTIVE:  History of Present Illness  The patient is a 59-year-old male with past medical history of hypertension, hypercholesterolemia, alpha-gal syndrome who is here for follow-up.    He reports that his weight loss and diarrhea have resolved since he modified his diet due to his alpha-gal syndrome. He has consulted with three different doctors, including an allergist, to identify potential allergens. He underwent a stress test today, which showed normal results. A CT scan was also performed which was normal.  Patient had allergy testing done and has been allergic to Mammalian meat and diary         Hemoglobin A1C   Date Value Ref Range Status   2024 6.3 (H) 4.8 - 5.6 % Final     Comment:                 Prediabetes: 5.7 - 6.4           Diabetes: >6.4           Glycemic control for adults with diabetes: <7.0        Hemoglobin   Date Value Ref Range Status   2024 15.7 13.0 - 17.7 g/dL Final     Hematocrit   Date Value Ref Range Status   2024 47.3 37.5 - 51.0 % Final     Creatinine   Date Value Ref Range Status   2024 1.02 0.76 - 1.27 mg/dL Final     Glucose   Date Value Ref Range Status   2024 108 (H) 70 - 99 mg/dL Final     TSH   Date Value Ref Range Status   2024 1.310 0.450 - 4.500 uIU/mL Final     Potassium   Date Value Ref Range Status   2024 4.2 3.5 - 5.2 mmol/L Final     Cholesterol, Total   Date Value Ref Range Status   2024 118 100 - 199 mg/dL Final     HDL   Date Value Ref Range Status   2024 32 (L) >39 mg/dL Final     Triglycerides   Date Value Ref Range Status   2024 100 0 - 149 mg/dL Final     Lab Results   Component Value Date    LDL 67 2024

## 2025-02-05 ENCOUNTER — OFFICE VISIT (OUTPATIENT)
Facility: CLINIC | Age: 60
End: 2025-02-05
Payer: COMMERCIAL

## 2025-02-05 VITALS
HEIGHT: 71 IN | HEART RATE: 82 BPM | TEMPERATURE: 97.8 F | RESPIRATION RATE: 16 BRPM | WEIGHT: 223 LBS | SYSTOLIC BLOOD PRESSURE: 138 MMHG | DIASTOLIC BLOOD PRESSURE: 88 MMHG | BODY MASS INDEX: 31.22 KG/M2 | OXYGEN SATURATION: 98 %

## 2025-02-05 DIAGNOSIS — I10 ESSENTIAL HYPERTENSION, BENIGN: ICD-10-CM

## 2025-02-05 DIAGNOSIS — Z12.5 SCREENING FOR PROSTATE CANCER: ICD-10-CM

## 2025-02-05 DIAGNOSIS — Z91.018 ALLERGY TO ALPHA-GAL: ICD-10-CM

## 2025-02-05 DIAGNOSIS — F41.9 ANXIETY: ICD-10-CM

## 2025-02-05 DIAGNOSIS — E78.00 HYPERCHOLESTEROLEMIA: ICD-10-CM

## 2025-02-05 DIAGNOSIS — I10 ESSENTIAL HYPERTENSION, BENIGN: Primary | ICD-10-CM

## 2025-02-05 DIAGNOSIS — R73.03 PRE-DIABETES: ICD-10-CM

## 2025-02-05 DIAGNOSIS — F51.01 PRIMARY INSOMNIA: ICD-10-CM

## 2025-02-05 DIAGNOSIS — G47.33 OSA ON CPAP: ICD-10-CM

## 2025-02-05 DIAGNOSIS — R00.0 TACHYCARDIA: ICD-10-CM

## 2025-02-05 PROCEDURE — 3079F DIAST BP 80-89 MM HG: CPT | Performed by: INTERNAL MEDICINE

## 2025-02-05 PROCEDURE — 99214 OFFICE O/P EST MOD 30 MIN: CPT | Performed by: INTERNAL MEDICINE

## 2025-02-05 PROCEDURE — 3075F SYST BP GE 130 - 139MM HG: CPT | Performed by: INTERNAL MEDICINE

## 2025-02-05 RX ORDER — TIRZEPATIDE 2.5 MG/.5ML
2.5 INJECTION, SOLUTION SUBCUTANEOUS WEEKLY
Qty: 2 ML | Refills: 0 | Status: SHIPPED | OUTPATIENT
Start: 2025-02-05

## 2025-02-05 RX ORDER — HYDROXYZINE HYDROCHLORIDE 25 MG/1
25 TABLET, FILM COATED ORAL NIGHTLY
Qty: 30 TABLET | Refills: 0 | Status: SHIPPED | OUTPATIENT
Start: 2025-02-05 | End: 2025-03-07

## 2025-02-05 SDOH — ECONOMIC STABILITY: FOOD INSECURITY: WITHIN THE PAST 12 MONTHS, YOU WORRIED THAT YOUR FOOD WOULD RUN OUT BEFORE YOU GOT MONEY TO BUY MORE.: NEVER TRUE

## 2025-02-05 SDOH — ECONOMIC STABILITY: FOOD INSECURITY: WITHIN THE PAST 12 MONTHS, THE FOOD YOU BOUGHT JUST DIDN'T LAST AND YOU DIDN'T HAVE MONEY TO GET MORE.: NEVER TRUE

## 2025-02-05 ASSESSMENT — ENCOUNTER SYMPTOMS
COUGH: 0
VOMITING: 0
SHORTNESS OF BREATH: 0
ABDOMINAL PAIN: 0
DIARRHEA: 0
NAUSEA: 0

## 2025-02-05 ASSESSMENT — PATIENT HEALTH QUESTIONNAIRE - PHQ9
1. LITTLE INTEREST OR PLEASURE IN DOING THINGS: NOT AT ALL
SUM OF ALL RESPONSES TO PHQ9 QUESTIONS 1 & 2: 1
SUM OF ALL RESPONSES TO PHQ QUESTIONS 1-9: 1
2. FEELING DOWN, DEPRESSED OR HOPELESS: SEVERAL DAYS

## 2025-02-05 NOTE — PROGRESS NOTES
[facility]  [address], [place] [address]  Phone: [phone]      Casey Elias (: 1965) is a 59 y.o. male, established patient, here for evaluation of the following chief complaint(s):  Hypertension and Follow-up         SUBJECTIVE/OBJECTIVE:  History of Present Illness  The patient is a 59-year-old male with a past medical history of hypertension, hypercholesterolemia, and alpha-gal syndrome, seen today for follow-up.    He reports persistent elevated blood pressure and heart rate, which he attributes to his sleep disturbances. He has been experiencing poor sleep quality, averaging only 4 hours of sleep per night over the past 3 weeks. He has been using a CPAP machine but discontinued its use 4 to 5 days ago due to increased daytime fatigue and anxiety. He also reports a transient sensation of body vibration around midday, which resolves spontaneously. He expresses concern about the potential impact of his sleep issues on his overall health. He has been sleeping in an upright position without the CPAP machine to prevent episodes of apnea when lying supine. He attempts to sleep on his side as much as possible, but this results in significant discomfort  to hip the following day. He has not undergone any recent blood work. He has been adhering to a low-fat, low-cholesterol diet and engaging in regular exercise, which he finds beneficial for his anxiety. He has not yet undergone a colonoscopy. He is drinking milk which he was not drinking before because of the allergy. He started out with milk and maybe cheese and he was fine. He is trying different foods to see what he can and can not eat.   Patient wants to try Zepbound for obstructive sleep apnea since patient cannot use CPAP machine all the time.  Patient thinks by losing weight, it will help with the sleep apnea    ALLERGIES  The patient is allergic to ALPHA-GAL.    MEDICATIONS  Current:

## 2025-02-05 NOTE — PROGRESS NOTES
Chief Complaint   Patient presents with    Hypertension    Follow-up   BP (!) 151/95 (Site: Right Upper Arm, Position: Sitting, Cuff Size: Medium Adult)   Pulse 80   Temp 97.8 °F (36.6 °C) (Oral)   Resp 16   Ht 1.803 m (5' 11\")   Wt 101.2 kg (223 lb)   SpO2 98%   BMI 31.10 kg/m²     \"Have you been to the ER, urgent care clinic since your last visit?  Hospitalized since your last visit?\"    NO    “Have you seen or consulted any other health care providers outside our system since your last visit?”    NO      “Have you had a colorectal cancer screening such as a colonoscopy/FIT/Cologuard?    NO    No colonoscopy on file  No cologuard on file  No FIT/FOBT on file   No flexible sigmoidoscopy on file

## 2025-02-06 ENCOUNTER — TELEPHONE (OUTPATIENT)
Facility: CLINIC | Age: 60
End: 2025-02-06

## 2025-02-06 DIAGNOSIS — G47.33 OSA ON CPAP: ICD-10-CM

## 2025-03-04 LAB
ALBUMIN SERPL-MCNC: 4.3 G/DL (ref 3.8–4.9)
ALP SERPL-CCNC: 78 IU/L (ref 44–121)
ALT SERPL-CCNC: 31 IU/L (ref 0–44)
APPEARANCE UR: CLEAR
AST SERPL-CCNC: 24 IU/L (ref 0–40)
BACTERIA #/AREA URNS HPF: NORMAL /[HPF]
BASOPHILS # BLD AUTO: 0.1 X10E3/UL (ref 0–0.2)
BASOPHILS NFR BLD AUTO: 1 %
BILIRUB SERPL-MCNC: 0.8 MG/DL (ref 0–1.2)
BILIRUB UR QL STRIP: NEGATIVE
BUN SERPL-MCNC: 15 MG/DL (ref 6–24)
BUN/CREAT SERPL: 14 (ref 9–20)
CALCIUM SERPL-MCNC: 9.1 MG/DL (ref 8.7–10.2)
CASTS URNS QL MICRO: NORMAL /LPF
CHLORIDE SERPL-SCNC: 105 MMOL/L (ref 96–106)
CHOLEST SERPL-MCNC: 126 MG/DL (ref 100–199)
CO2 SERPL-SCNC: 25 MMOL/L (ref 20–29)
COLOR UR: YELLOW
CREAT SERPL-MCNC: 1.04 MG/DL (ref 0.76–1.27)
EGFRCR SERPLBLD CKD-EPI 2021: 84 ML/MIN/1.73
EOSINOPHIL # BLD AUTO: 0.2 X10E3/UL (ref 0–0.4)
EOSINOPHIL NFR BLD AUTO: 3 %
EPI CELLS #/AREA URNS HPF: NORMAL /HPF (ref 0–10)
ERYTHROCYTE [DISTWIDTH] IN BLOOD BY AUTOMATED COUNT: 12.4 % (ref 11.6–15.4)
GLOBULIN SER CALC-MCNC: 3.1 G/DL (ref 1.5–4.5)
GLUCOSE SERPL-MCNC: 99 MG/DL (ref 70–99)
GLUCOSE UR QL STRIP: NEGATIVE
HBA1C MFR BLD: 5.9 % (ref 4.8–5.6)
HCT VFR BLD AUTO: 47.2 % (ref 37.5–51)
HDLC SERPL-MCNC: 32 MG/DL
HGB BLD-MCNC: 15.2 G/DL (ref 13–17.7)
HGB UR QL STRIP: NEGATIVE
IMM GRANULOCYTES # BLD AUTO: 0 X10E3/UL (ref 0–0.1)
IMM GRANULOCYTES NFR BLD AUTO: 0 %
KETONES UR QL STRIP: NEGATIVE
LDLC SERPL CALC-MCNC: 69 MG/DL (ref 0–99)
LEUKOCYTE ESTERASE UR QL STRIP: NEGATIVE
LYMPHOCYTES # BLD AUTO: 1.6 X10E3/UL (ref 0.7–3.1)
LYMPHOCYTES NFR BLD AUTO: 30 %
MCH RBC QN AUTO: 29.9 PG (ref 26.6–33)
MCHC RBC AUTO-ENTMCNC: 32.2 G/DL (ref 31.5–35.7)
MCV RBC AUTO: 93 FL (ref 79–97)
MICRO URNS: NORMAL
MICRO URNS: NORMAL
MONOCYTES # BLD AUTO: 0.4 X10E3/UL (ref 0.1–0.9)
MONOCYTES NFR BLD AUTO: 8 %
NEUTROPHILS # BLD AUTO: 3.1 X10E3/UL (ref 1.4–7)
NEUTROPHILS NFR BLD AUTO: 58 %
NITRITE UR QL STRIP: NEGATIVE
PH UR STRIP: 6.5 [PH] (ref 5–7.5)
PLATELET # BLD AUTO: 208 X10E3/UL (ref 150–450)
POTASSIUM SERPL-SCNC: 4.1 MMOL/L (ref 3.5–5.2)
PROT SERPL-MCNC: 7.4 G/DL (ref 6–8.5)
PROT UR QL STRIP: NEGATIVE
PSA SERPL-MCNC: 4.6 NG/ML (ref 0–4)
RBC # BLD AUTO: 5.09 X10E6/UL (ref 4.14–5.8)
RBC #/AREA URNS HPF: NORMAL /HPF (ref 0–2)
SODIUM SERPL-SCNC: 143 MMOL/L (ref 134–144)
SP GR UR STRIP: 1.02 (ref 1–1.03)
TRIGL SERPL-MCNC: 144 MG/DL (ref 0–149)
TSH SERPL DL<=0.005 MIU/L-ACNC: 1.39 UIU/ML (ref 0.45–4.5)
URINALYSIS REFLEX: NORMAL
UROBILINOGEN UR STRIP-MCNC: 0.2 MG/DL (ref 0.2–1)
VLDLC SERPL CALC-MCNC: 25 MG/DL (ref 5–40)
WBC # BLD AUTO: 5.4 X10E3/UL (ref 3.4–10.8)
WBC #/AREA URNS HPF: NORMAL /HPF (ref 0–5)

## 2025-03-06 ENCOUNTER — OFFICE VISIT (OUTPATIENT)
Facility: CLINIC | Age: 60
End: 2025-03-06
Payer: COMMERCIAL

## 2025-03-06 VITALS
OXYGEN SATURATION: 98 % | RESPIRATION RATE: 16 BRPM | HEIGHT: 70 IN | BODY MASS INDEX: 31.92 KG/M2 | HEART RATE: 80 BPM | SYSTOLIC BLOOD PRESSURE: 150 MMHG | DIASTOLIC BLOOD PRESSURE: 94 MMHG | WEIGHT: 223 LBS | TEMPERATURE: 98.1 F

## 2025-03-06 DIAGNOSIS — E78.00 HYPERCHOLESTEROLEMIA: ICD-10-CM

## 2025-03-06 DIAGNOSIS — F41.9 ANXIETY: ICD-10-CM

## 2025-03-06 DIAGNOSIS — R97.20 ELEVATED PSA: ICD-10-CM

## 2025-03-06 DIAGNOSIS — Z00.01 ENCOUNTER FOR WELL ADULT EXAM WITH ABNORMAL FINDINGS: Primary | ICD-10-CM

## 2025-03-06 DIAGNOSIS — I10 ESSENTIAL HYPERTENSION, BENIGN: ICD-10-CM

## 2025-03-06 DIAGNOSIS — R73.03 PRE-DIABETES: ICD-10-CM

## 2025-03-06 DIAGNOSIS — Z12.11 SCREENING FOR COLON CANCER: ICD-10-CM

## 2025-03-06 DIAGNOSIS — G47.33 OSA (OBSTRUCTIVE SLEEP APNEA): ICD-10-CM

## 2025-03-06 PROCEDURE — 3077F SYST BP >= 140 MM HG: CPT | Performed by: INTERNAL MEDICINE

## 2025-03-06 PROCEDURE — 99396 PREV VISIT EST AGE 40-64: CPT | Performed by: INTERNAL MEDICINE

## 2025-03-06 PROCEDURE — 3080F DIAST BP >= 90 MM HG: CPT | Performed by: INTERNAL MEDICINE

## 2025-03-06 RX ORDER — ZOSTER VACCINE RECOMBINANT, ADJUVANTED 50 MCG/0.5
0.5 KIT INTRAMUSCULAR SEE ADMIN INSTRUCTIONS
Qty: 0.5 ML | Refills: 0 | Status: SHIPPED | OUTPATIENT
Start: 2025-03-06 | End: 2025-09-02

## 2025-03-06 RX ORDER — METOPROLOL SUCCINATE 25 MG/1
25 TABLET, EXTENDED RELEASE ORAL
Qty: 90 TABLET | Refills: 1 | Status: SHIPPED | OUTPATIENT
Start: 2025-03-06

## 2025-03-06 SDOH — ECONOMIC STABILITY: FOOD INSECURITY: WITHIN THE PAST 12 MONTHS, THE FOOD YOU BOUGHT JUST DIDN'T LAST AND YOU DIDN'T HAVE MONEY TO GET MORE.: NEVER TRUE

## 2025-03-06 SDOH — ECONOMIC STABILITY: FOOD INSECURITY: WITHIN THE PAST 12 MONTHS, YOU WORRIED THAT YOUR FOOD WOULD RUN OUT BEFORE YOU GOT MONEY TO BUY MORE.: NEVER TRUE

## 2025-03-06 ASSESSMENT — PATIENT HEALTH QUESTIONNAIRE - PHQ9
SUM OF ALL RESPONSES TO PHQ QUESTIONS 1-9: 0
SUM OF ALL RESPONSES TO PHQ QUESTIONS 1-9: 0
1. LITTLE INTEREST OR PLEASURE IN DOING THINGS: NOT AT ALL
2. FEELING DOWN, DEPRESSED OR HOPELESS: NOT AT ALL
SUM OF ALL RESPONSES TO PHQ QUESTIONS 1-9: 0
SUM OF ALL RESPONSES TO PHQ QUESTIONS 1-9: 0

## 2025-03-06 ASSESSMENT — ENCOUNTER SYMPTOMS
DIARRHEA: 0
NAUSEA: 0
SHORTNESS OF BREATH: 0
ABDOMINAL PAIN: 0
COUGH: 0
VOMITING: 0

## 2025-03-06 NOTE — PROGRESS NOTES
Chief Complaint   Patient presents with    Follow-up     BP (!) 149/93 (Site: Right Upper Arm, Position: Sitting, Cuff Size: Large Adult)   Pulse 84   Temp 98.1 °F (36.7 °C) (Oral)   Resp 16   Ht 1.778 m (5' 10\")   Wt 101.2 kg (223 lb)   SpO2 98%   BMI 32.00 kg/m²   \"Have you been to the ER, urgent care clinic since your last visit?  Hospitalized since your last visit?\"    NO    “Have you seen or consulted any other health care providers outside our system since your last visit?”    NO      “Have you had a colorectal cancer screening such as a colonoscopy/FIT/Cologuard?    NO    No colonoscopy on file  No cologuard on file  No FIT/FOBT on file   No flexible sigmoidoscopy on file

## 2025-03-06 NOTE — PROGRESS NOTES
[facility]  [address], [place] [address]  Phone: [phone]      Casey Elias (: 1965) is a 59 y.o. male, established patient, here for evaluation of the following chief complaint(s):  Follow-up         SUBJECTIVE/OBJECTIVE:  History of Present Illness  The patient is a 59-year-old male with a past medical history of hypertension, hypercholesterolemia, alpha-gal syndrome, and prediabetes, here for a follow-up visit and wellness visit.    He has been utilizing hydroxyzine on an as-needed basis, which he reports induces drowsiness. He has not yet received his shingles vaccine. He has an appointment scheduled for a colonoscopy.    He has been diagnosed with sleep apnea and has undergone 5 sleep studies under the care of 2 different physicians. He has been advised to undergo another sleep study, which can be conducted at home. He reports a sensation of swelling in his neck when using his CPAP machine. He has been using the CPAP machine for approximately 5 years and has been informed that it requires replacement parts. He has been advised that if he does not use the machine as directed, his insurance will not cover it.    He has a long-standing history of seborrhea, for which he applies a cream. He was previously using a steroid cream but has since discontinued its use. He has attempted to use various shampoos, but they result in scalp dryness.    He does not an upcoming appointment with a urologist. He is not currently on any testosterone supplements. He has not engaged in any activities such as horse riding or biking that could potentially irritate the prostate. However, he has recently used his ATV for driveway maintenance.   He has been engaging in daily walks for exercise.    He has been monitoring his blood pressure at home, which was recorded as 138/85 prior to leaving for this appointment. He has been adhering to the advice of reading books and

## 2025-03-28 ENCOUNTER — OFFICE VISIT (OUTPATIENT)
Age: 60
End: 2025-03-28
Payer: COMMERCIAL

## 2025-03-28 VITALS
DIASTOLIC BLOOD PRESSURE: 91 MMHG | OXYGEN SATURATION: 99 % | SYSTOLIC BLOOD PRESSURE: 157 MMHG | HEIGHT: 71 IN | BODY MASS INDEX: 33.46 KG/M2 | HEART RATE: 97 BPM | WEIGHT: 239 LBS

## 2025-03-28 DIAGNOSIS — R97.20 ELEVATED PSA: Primary | ICD-10-CM

## 2025-03-28 LAB
BILIRUBIN, URINE, POC: NEGATIVE
BLOOD URINE, POC: NEGATIVE
GLUCOSE URINE, POC: NEGATIVE
KETONES, URINE, POC: NEGATIVE
LEUKOCYTE ESTERASE, URINE, POC: NEGATIVE
NITRITE, URINE, POC: NEGATIVE
PH, URINE, POC: 6 (ref 4.6–8)
PROTEIN,URINE, POC: NEGATIVE
SPECIFIC GRAVITY, URINE, POC: 1.02 (ref 1–1.03)
URINALYSIS CLARITY, POC: CLEAR
URINALYSIS COLOR, POC: YELLOW
UROBILINOGEN, POC: NORMAL

## 2025-03-28 PROCEDURE — 99203 OFFICE O/P NEW LOW 30 MIN: CPT | Performed by: STUDENT IN AN ORGANIZED HEALTH CARE EDUCATION/TRAINING PROGRAM

## 2025-03-28 PROCEDURE — 81003 URINALYSIS AUTO W/O SCOPE: CPT | Performed by: STUDENT IN AN ORGANIZED HEALTH CARE EDUCATION/TRAINING PROGRAM

## 2025-03-28 PROCEDURE — 3077F SYST BP >= 140 MM HG: CPT | Performed by: STUDENT IN AN ORGANIZED HEALTH CARE EDUCATION/TRAINING PROGRAM

## 2025-03-28 PROCEDURE — 3080F DIAST BP >= 90 MM HG: CPT | Performed by: STUDENT IN AN ORGANIZED HEALTH CARE EDUCATION/TRAINING PROGRAM

## 2025-03-28 RX ORDER — SODIUM PHOSPHATE, DIBASIC AND SODIUM PHOSPHATE, MONOBASIC 7; 19 G/230ML; G/230ML
1 ENEMA RECTAL ONCE
Status: CANCELLED | COMMUNITY
Start: 2025-03-28 | End: 2025-03-28

## 2025-03-28 RX ORDER — CIPROFLOXACIN 500 MG/1
500 TABLET, FILM COATED ORAL 2 TIMES DAILY
Qty: 10 TABLET | Refills: 0 | Status: CANCELLED | OUTPATIENT
Start: 2025-03-28 | End: 2025-04-02

## 2025-03-28 NOTE — PROGRESS NOTES
Chief Complaint   Patient presents with    New Patient    Elevated PSA     1. Have you been to the ER, urgent care clinic since your last visit?  Hospitalized since your last visit?No    2. Have you seen or consulted any other health care providers outside of the [facility] since your last visit?  Include any pap smears or colon screening. No  BP (!) 157/91   Pulse 97   Ht 1.803 m (5' 11\")   Wt 108.4 kg (239 lb)   SpO2 99%   BMI 33.33 kg/m²

## 2025-03-28 NOTE — PROGRESS NOTES
HISTORY OF PRESENT ILLNESS    History of Present Illness  The patient is a 59-year-old male who presents for elevated PSA. His PSA was 4.6 on 03/03/2025 and 3.1 on 02/21/2024.    He reports no urinary symptoms, including hematuria, dysuria, fevers, or lower abdominal pain. He has not been tested for urinary tract infections. He recalls an episode of severe burning pain during urination while out of town, which resolved spontaneously. He also mentions a recent full-body scan with contrast, which did not reveal any abnormalities. He is under the care of Dr. Mack, who has previously checked his PSA levels. He reports no family history of prostate cancer in his immediate family, although he suspects it may be present in his extended family.    He has been diagnosed with alpha-gal syndrome following a tick bite a few months ago, which initially presented as a week-long episode of diarrhea. Since then, he has experienced gastrointestinal issues, predominantly diarrhea, without any associated pain. He also reports stomach aches and abnormal bowel sounds. He has been managing his diet carefully and using an cheyanne to monitor potential cross-contamination of foods. He notes that his brother-in-law has developed an allergy to red meat due to the same condition. He experiences a warm sensation during urination, which he believes is related to his diarrhea. He also reports occasional fevers and night sweats. He maintains a high level of physical activity and energy.    Supplemental Information  He has a history of ectopic heartbeat and hypertension.    FAMILY HISTORY  His father had brain cancer. He reports no family history of prostate cancer in his immediate family, although he suspects it may be present in his extended family.    PAST MEDICAL HISTORY:  PMHx (including negatives):  has a past medical history of Benign essential hypertension, H/O seasonal allergies, Hypercholesterolemia, Prediabetes, and Sleep apnea in

## 2025-04-28 DIAGNOSIS — R97.20 ELEVATED PSA: ICD-10-CM

## 2025-04-29 ENCOUNTER — TELEPHONE (OUTPATIENT)
Age: 60
End: 2025-04-29

## 2025-05-04 DIAGNOSIS — E78.00 PURE HYPERCHOLESTEROLEMIA, UNSPECIFIED: ICD-10-CM

## 2025-05-04 DIAGNOSIS — I10 ESSENTIAL HYPERTENSION, BENIGN: ICD-10-CM

## 2025-05-04 RX ORDER — ATORVASTATIN CALCIUM 20 MG/1
20 TABLET, FILM COATED ORAL DAILY
Qty: 90 TABLET | Refills: 1 | Status: SHIPPED | OUTPATIENT
Start: 2025-05-04

## 2025-05-04 RX ORDER — VALSARTAN 320 MG/1
320 TABLET ORAL DAILY
Qty: 90 TABLET | Refills: 1 | Status: SHIPPED | OUTPATIENT
Start: 2025-05-04

## 2025-06-13 LAB — PSA SERPL-MCNC: 3.7 NG/ML (ref 0–4)

## 2025-06-16 ENCOUNTER — OFFICE VISIT (OUTPATIENT)
Age: 60
End: 2025-06-16
Payer: COMMERCIAL

## 2025-06-16 VITALS
OXYGEN SATURATION: 97 % | SYSTOLIC BLOOD PRESSURE: 163 MMHG | HEART RATE: 91 BPM | BODY MASS INDEX: 33.46 KG/M2 | DIASTOLIC BLOOD PRESSURE: 103 MMHG | WEIGHT: 239 LBS | HEIGHT: 71 IN

## 2025-06-16 DIAGNOSIS — R97.20 ELEVATED PSA: Primary | ICD-10-CM

## 2025-06-16 PROCEDURE — 3080F DIAST BP >= 90 MM HG: CPT | Performed by: STUDENT IN AN ORGANIZED HEALTH CARE EDUCATION/TRAINING PROGRAM

## 2025-06-16 PROCEDURE — 3077F SYST BP >= 140 MM HG: CPT | Performed by: STUDENT IN AN ORGANIZED HEALTH CARE EDUCATION/TRAINING PROGRAM

## 2025-06-16 PROCEDURE — 99213 OFFICE O/P EST LOW 20 MIN: CPT | Performed by: STUDENT IN AN ORGANIZED HEALTH CARE EDUCATION/TRAINING PROGRAM

## 2025-06-16 NOTE — PROGRESS NOTES
HISTORY OF PRESENT ILLNESS    History of Present Illness  The patient is a 59-year-old male referred for elevated PSA levels.    A repeat PSA test last week showed a decrease to 3.7 from 4.6 in March 2025. He experienced a week-long episode of diarrhea and had a previous COVID-19 infection. He has no smoking history but chews plastic cigar tips to deter constant eating.    The patient is currently on antihypertensive medication and has been advised to start a beta blocker, though he is uncertain about this recommendation. His blood pressure was elevated during the visit.    He was taken off the CPAP machine by his doctor, which has led to poor sleep quality. He believes he still needs the CPAP machine.    SOCIAL HISTORY  - Does not smoke but chews plastic cigar tips to avoid constant eating    PAST MEDICAL HISTORY:  PMHx (including negatives):  has a past medical history of Benign essential hypertension, H/O seasonal allergies, Hypercholesterolemia, Prediabetes, and Sleep apnea in adult.   PSurgHx:  has no past surgical history on file.  PSocHx:  reports that he has never smoked. He has never used smokeless tobacco. He reports that he does not drink alcohol and does not use drugs.     Physical Exam  General: No acute distress.  CV: Well perfused, regular rate  Lungs: Normal work of breathing  Abdomen: Nontender, nondistended      Results  Labs  PSA: 3.7, down from 4.6 in March 2025.    I personally reviewed and interpreted all available imaging and results with the patient today.     Assessment & Plan  1. Elevated PSA: Stable. PSA 3.7 on 04/2024, previously 4.6 in 03/2024.  - Repeat PSA test in 6 months  - Check PSA a week before next appointment    2. Hypertension: Elevated.  - Continue current antihypertensive medication  - Consider starting a beta blocker  - Discussed benefits of beta blockers in reducing pulse rate and overall blood pressure    3. Sleep apnea: Poor sleep quality.  - Discussed benefits of CPAP

## 2025-06-16 NOTE — PROGRESS NOTES
Chief Complaint   Patient presents with    Follow-up     1. Have you been to the ER, urgent care clinic since your last visit?  Hospitalized since your last visit?No    2. Have you seen or consulted any other health care providers outside of the [facility] since your last visit?  Include any pap smears or colon screening. No  BP (!) 163/103   Pulse 91   Ht 1.803 m (5' 11\")   Wt 108.4 kg (239 lb)   SpO2 97%   BMI 33.33 kg/m²